# Patient Record
Sex: FEMALE | Race: BLACK OR AFRICAN AMERICAN
[De-identification: names, ages, dates, MRNs, and addresses within clinical notes are randomized per-mention and may not be internally consistent; named-entity substitution may affect disease eponyms.]

---

## 2018-04-13 ENCOUNTER — HOSPITAL ENCOUNTER (OUTPATIENT)
Dept: HOSPITAL 63 - SURG | Age: 58
Discharge: HOME | End: 2018-04-13
Attending: INTERNAL MEDICINE
Payer: MEDICARE

## 2018-04-13 VITALS — SYSTOLIC BLOOD PRESSURE: 112 MMHG | DIASTOLIC BLOOD PRESSURE: 63 MMHG

## 2018-04-13 DIAGNOSIS — E03.9: ICD-10-CM

## 2018-04-13 DIAGNOSIS — F32.9: ICD-10-CM

## 2018-04-13 DIAGNOSIS — Z12.11: Primary | ICD-10-CM

## 2018-04-13 DIAGNOSIS — I10: ICD-10-CM

## 2018-04-13 DIAGNOSIS — F79: ICD-10-CM

## 2018-04-13 DIAGNOSIS — D64.9: ICD-10-CM

## 2018-04-28 ENCOUNTER — HOSPITAL ENCOUNTER (EMERGENCY)
Dept: HOSPITAL 63 - ER | Age: 58
Discharge: HOME | End: 2018-04-28
Payer: MEDICARE

## 2018-04-28 VITALS — WEIGHT: 285 LBS | HEIGHT: 66 IN | BODY MASS INDEX: 45.8 KG/M2

## 2018-04-28 VITALS — DIASTOLIC BLOOD PRESSURE: 92 MMHG | SYSTOLIC BLOOD PRESSURE: 120 MMHG

## 2018-04-28 DIAGNOSIS — L50.9: Primary | ICD-10-CM

## 2018-04-28 DIAGNOSIS — E03.9: ICD-10-CM

## 2018-04-28 PROCEDURE — 99283 EMERGENCY DEPT VISIT LOW MDM: CPT

## 2018-04-28 NOTE — PHYS DOC
Past History


Past Medical History:  Hypothyroid, Hypotension





Adult General


[facility]


HPI





Patient is a 58 year old F who presents with hives that started today. She has 

no other symptoms at this time. She denies breathing difficulty She was seen by 

her primary care doctor and started on colchicine to treat gout. She was also 

started on Keflex that same time. This was approximately week ago. The 

colchicine course is completed however Keflex has several more days left She 

has no other new medications. She has no other associated symptoms at this 

time. She has no exacerbating or alleviating factors.





Review of Systems


Review of Systems





Constitutional: Denies fever or chills []


Eyes: Denies change in visual acuity, redness, or eye pain []


HENT: Denies nasal congestion or sore throat []


Respiratory: Denies cough or shortness of breath []


Cardiovascular: No additional information not addressed in HPI []


GI: Denies abdominal pain, nausea, vomiting, bloody stools or diarrhea []


: Denies dysuria or hematuria []


Musculoskeletal: Denies back pain or joint pain []


Integument: Negative except history of present illness


Neurologic: Denies headache, focal weakness or sensory changes []


Endocrine: Denies polyuria or polydipsia []





All other systems were reviewed and found to be within normal limits, except as 

documented in this note.





Family History


Family History


No pertinent family medical history was reported





Current Medications


Current Medications


Current medications were reviewed





Allergies


Allergies


Allergies were reviewed





Physical Exam


Physical Exam





Constitutional: Well developed, well nourished, no acute distress, non-toxic 

appearance. []


HENT: Normocephalic, atraumatic, bilateral external ears normal, oropharynx 

moist, no oral exudates, nose normal. []


Eyes: PERRLA, EOMI, conjunctiva normal, no discharge. [] 


Neck: Normal range of motion, no tenderness, supple, no stridor. [] 


Cardiovascular:Heart rate regular rhythm, no murmur []


Lungs & Thorax:  Bilateral breath sounds clear to auscultation []


Abdomen: Bowel sounds normal, soft, no tenderness, no masses, no pulsatile 

masses. [] 


Skin: Raised macular rash on the trunk predominantly.


Extremities: No tenderness, no cyanosis, no clubbing, ROM intact, no edema. [] 


Neurologic: Alert and oriented X 3, normal motor function, normal sensory 

function, no focal deficits noted. []


Psychologic: Affect normal, judgement normal, mood normal. []





Current Patient Data


Vital Signs





Vital Signs








  Date Time  Temp Pulse Resp B/P (MAP) Pulse Ox O2 Delivery O2 Flow Rate FiO2


 


4/28/18 10:58 98.1 100 24  98 Room Air  











EKG


EKG


[]





Radiology/Procedures


Radiology/Procedures


[]





Course & Med Decision Making


Course & Med Decision Making


Pertinent Labs and Imaging studies reviewed. (See chart for details)





[]





Dragon Disclaimer


Dragon Disclaimer


This electronic medical record was generated, in whole or in part, using a 

voice recognition dictation system.





Departure


Departure:


Impression:  


 Primary Impression:  


 Hives


Disposition:  01 HOME, SELF-CARE


Condition:  STABLE


Referrals:  


MIYA CASTRO (PCP)


Patient Instructions:  Hives





Additional Instructions:  


Desire was seen in the emergency department for skin rash. No emergency 

medical condition was found on history or physical exam. Her symptoms are most 

consistent with hives. She is encouraged to discontinue her antibiotic. She is 

given a prescription for topical steroids to be used as needed for symptom 

management. She was also found to have swelling in her legs and was advised to 

use TRACY hose regularly. She is encouraged to return to the emergency room if 

she develops new or worsening symptoms. She is also advised follow-up with her 

primary care doctor as needed for further management.


Scripts


Triamcinolone Acetonide (TRIAMCINOLONE ACETONIDE) 15 Gm Cream..g.


1 YURY TP BID, #15 GM 1 Refill


   Prov: MARILUZ ABDALLA MD         4/28/18











MARILUZ ABDALLA MD Apr 28, 2018 11:00

## 2018-12-01 ENCOUNTER — HOSPITAL ENCOUNTER (INPATIENT)
Dept: HOSPITAL 63 - ER | Age: 58
LOS: 3 days | Discharge: HOME | DRG: 175 | End: 2018-12-04
Attending: INTERNAL MEDICINE | Admitting: INTERNAL MEDICINE
Payer: MEDICARE

## 2018-12-01 VITALS — BODY MASS INDEX: 52.87 KG/M2 | WEIGHT: 287.31 LBS | HEIGHT: 62 IN

## 2018-12-01 DIAGNOSIS — E03.9: ICD-10-CM

## 2018-12-01 DIAGNOSIS — E43: ICD-10-CM

## 2018-12-01 DIAGNOSIS — I26.99: Primary | ICD-10-CM

## 2018-12-01 DIAGNOSIS — Q90.9: ICD-10-CM

## 2018-12-01 DIAGNOSIS — Z79.899: ICD-10-CM

## 2018-12-01 DIAGNOSIS — F03.90: ICD-10-CM

## 2018-12-01 DIAGNOSIS — Z90.710: ICD-10-CM

## 2018-12-01 DIAGNOSIS — Z79.01: ICD-10-CM

## 2018-12-01 DIAGNOSIS — I10: ICD-10-CM

## 2018-12-01 LAB
ALBUMIN SERPL-MCNC: 2.5 G/DL (ref 3.4–5)
ALBUMIN/GLOB SERPL: 0.4 {RATIO} (ref 1–1.7)
ALP SERPL-CCNC: 105 U/L (ref 46–116)
ALT SERPL-CCNC: 21 U/L (ref 14–59)
ANION GAP SERPL CALC-SCNC: 4 MMOL/L (ref 6–14)
APTT PPP: YELLOW S
AST SERPL-CCNC: 20 U/L (ref 15–37)
BACTERIA #/AREA URNS HPF: 0 /HPF
BASOPHILS # BLD AUTO: 0.2 X10^3/UL (ref 0–0.2)
BASOPHILS NFR BLD: 4 % (ref 0–3)
BILIRUB SERPL-MCNC: 0.1 MG/DL (ref 0.2–1)
BILIRUB UR QL STRIP: (no result)
BUN/CREAT SERPL: 18 (ref 6–20)
CA-I SERPL ISE-MCNC: 25 MG/DL (ref 7–20)
CALCIUM SERPL-MCNC: 8.8 MG/DL (ref 8.5–10.1)
CHLORIDE SERPL-SCNC: 98 MMOL/L (ref 98–107)
CO2 SERPL-SCNC: 37 MMOL/L (ref 21–32)
CREAT SERPL-MCNC: 1.4 MG/DL (ref 0.6–1)
EOSINOPHIL NFR BLD: 0.2 X10^3/UL (ref 0–0.7)
EOSINOPHIL NFR BLD: 3 % (ref 0–3)
ERYTHROCYTE [DISTWIDTH] IN BLOOD BY AUTOMATED COUNT: 16.5 % (ref 11.5–14.5)
FIBRINOGEN PPP-MCNC: CLEAR MG/DL
GFR SERPLBLD BASED ON 1.73 SQ M-ARVRAT: 46.9 ML/MIN
GLOBULIN SER-MCNC: 5.8 G/DL (ref 2.2–3.8)
GLUCOSE SERPL-MCNC: 103 MG/DL (ref 70–99)
GLUCOSE UR STRIP-MCNC: (no result) MG/DL
HCT VFR BLD CALC: 44.4 % (ref 36–47)
HGB BLD-MCNC: 14.2 G/DL (ref 12–15.5)
LIPASE: 94 U/L (ref 73–393)
LYMPHOCYTES # BLD: 1.2 X10^3/UL (ref 1–4.8)
LYMPHOCYTES NFR BLD AUTO: 26 % (ref 24–48)
MAGNESIUM SERPL-MCNC: 2.2 MG/DL (ref 1.8–2.4)
MCH RBC QN AUTO: 28 PG (ref 25–35)
MCHC RBC AUTO-ENTMCNC: 32 G/DL (ref 31–37)
MCV RBC AUTO: 89 FL (ref 79–100)
MONO #: 0.7 X10^3/UL (ref 0–1.1)
MONOCYTES NFR BLD: 15 % (ref 0–9)
NEUT #: 2.3 X10^3UL (ref 1.8–7.7)
NEUTROPHILS NFR BLD AUTO: 52 % (ref 31–73)
NITRITE UR QL STRIP: (no result)
PLATELET # BLD AUTO: 329 X10^3/UL (ref 140–400)
POTASSIUM SERPL-SCNC: 3.4 MMOL/L (ref 3.5–5.1)
PROT SERPL-MCNC: 8.3 G/DL (ref 6.4–8.2)
RBC # BLD AUTO: 5.01 X10^6/UL (ref 3.5–5.4)
RBC #/AREA URNS HPF: (no result) /HPF (ref 0–2)
SODIUM SERPL-SCNC: 139 MMOL/L (ref 136–145)
SP GR UR STRIP: 1.01
SQUAMOUS #/AREA URNS LPF: (no result) /LPF
UROBILINOGEN UR-MCNC: 0.2 MG/DL
WBC # BLD AUTO: 4.5 X10^3/UL (ref 4–11)
WBC #/AREA URNS HPF: (no result) /HPF (ref 0–4)

## 2018-12-01 PROCEDURE — 90471 IMMUNIZATION ADMIN: CPT

## 2018-12-01 PROCEDURE — 96375 TX/PRO/DX INJ NEW DRUG ADDON: CPT

## 2018-12-01 PROCEDURE — 96361 HYDRATE IV INFUSION ADD-ON: CPT

## 2018-12-01 PROCEDURE — 82553 CREATINE MB FRACTION: CPT

## 2018-12-01 PROCEDURE — 84484 ASSAY OF TROPONIN QUANT: CPT

## 2018-12-01 PROCEDURE — 90756 CCIIV4 VACC ABX FREE IM: CPT

## 2018-12-01 PROCEDURE — 85610 PROTHROMBIN TIME: CPT

## 2018-12-01 PROCEDURE — 80053 COMPREHEN METABOLIC PANEL: CPT

## 2018-12-01 PROCEDURE — 85730 THROMBOPLASTIN TIME PARTIAL: CPT

## 2018-12-01 PROCEDURE — 71260 CT THORAX DX C+: CPT

## 2018-12-01 PROCEDURE — 36415 COLL VENOUS BLD VENIPUNCTURE: CPT

## 2018-12-01 PROCEDURE — 80048 BASIC METABOLIC PNL TOTAL CA: CPT

## 2018-12-01 PROCEDURE — 81001 URINALYSIS AUTO W/SCOPE: CPT

## 2018-12-01 PROCEDURE — P9612 CATHETERIZE FOR URINE SPEC: HCPCS

## 2018-12-01 PROCEDURE — 96365 THER/PROPH/DIAG IV INF INIT: CPT

## 2018-12-01 PROCEDURE — 85025 COMPLETE CBC W/AUTO DIFF WBC: CPT

## 2018-12-01 PROCEDURE — 83690 ASSAY OF LIPASE: CPT

## 2018-12-01 PROCEDURE — 74177 CT ABD & PELVIS W/CONTRAST: CPT

## 2018-12-01 PROCEDURE — 83735 ASSAY OF MAGNESIUM: CPT

## 2018-12-01 PROCEDURE — 87641 MR-STAPH DNA AMP PROBE: CPT

## 2018-12-01 PROCEDURE — 96376 TX/PRO/DX INJ SAME DRUG ADON: CPT

## 2018-12-01 PROCEDURE — 93005 ELECTROCARDIOGRAM TRACING: CPT

## 2018-12-01 NOTE — RAD
CT CHEST ABD PELVIS W/CONTRAST dated 12/1/2018 9:55 PM

 

Indication:ABDOMINAL PAIN WITH NAUSEA, COUGH, 75MLS OMNI 300 IV CONTRAST. 

PT HAS DOWN'S SYNDROME. UNABLE TO FOLLOW BREATHING INSTRUCTIONS. 

 

Comparison: No comparison is available.

 

Technique: CT images were performed using an infusion of 75 mL Omnipaque 

300.

One or more of the following individualized dose reduction techniques were

utilized for this examination:  1. Automated exposure control  2. 

Adjustment of the mA and/or kV according to patient size  3. Use of 

iterative reconstruction technique

 

Findings: 

There is some motion artifact. The patient could not follow breath-holding

instructions. The lungs appear clear. The central airways are patent. No 

enlarged lymph nodes are seen. Incidental note is made of an aberrant 

right subclavian artery. Although this study was not performed to evaluate

the pulmonary arterial tree, large bilateral pulmonary emboli are seen at 

the distal main pulmonary arteries extending into lobar and segmental 

branches. There is suggestion of some flattening of the interventricular 

septum, raising the possibility of increased right heart pressure.

 

The liver and spleen are homogeneous in density and normal in 

configuration. The gallbladder was decompressed at the time of scanning. 

Both kidneys enhance with contrast. No mass or obstruction is seen. The 

adrenal glands are not enlarged. The pancreas appears normal. No 

adenopathy or soft tissue mass is seen in the abdomen. There is no 

apparent inflammatory process. Moderate stool is present through the 

colon. A normal appendix appears to be seen extending anteriorly from the 

cecum.

 

Images through the pelvis show no abnormality of the distal ureters or 

bladder. No pelvic or inguinal adenopathy is seen.   

 

IMPRESSION:

Large bilateral pulmonary emboli. There may be increased right heart 

pressure.

 

No acute findings in the abdomen or pelvis.

 

Critical results were telephoned to the emergency room physician.

 

Electronically signed by: Daniel Copeland Jr., MD (12/1/2018 10:26 PM) 

[facility]

## 2018-12-01 NOTE — EKG
[facility] [address], [place] [address]

Test Date:    2018               Test Time:    21:39:54

Pat Name:     MODESTO GARCIA             Department:   

Patient ID:   [id number]           Room:          

Gender:       F                        Technician:   

:          [id number]               Requested By: NATHALIE BLANDON

Order Number: [id number]            Reading MD:   Dayday Mckeon

                                 Measurements

Intervals                              Axis          

Rate:         89                       P:            

KS:                                    QRS:          -66

QRSD:         86                       T:            34

QT:           368                                    

QTc:          454                                    

                           Interpretive Statements

SINUS RHYTHM

ABNORMAL LEFT AXIS DEVIATION

LEFT ANTERIOR FASCICULAR BLOCK

QRS(T) CONTOUR ABNORMALITY

CONSIDER ANTEROSEPTAL MYOCARDIAL DAMAGE



Electronically Signed On 12-3-2018 8:59:35 CST by Dayday Mckeon

## 2018-12-01 NOTE — PHYS DOC
Past History


Past Medical History:  Anemia, Hypertension, Hypothyroid


Additional Past Medical Histor:  Down's Syndrome


Past Medical History


Unable to obtain due to hx of Down's Syndrome


Past Surgical History:  Hysterectomy


Past Surgical History


Unable to obtain due to hx of Down's Syndrome


Social History


Unable to obtain due to hx of Down's Syndrome





Adult General


Chief Complaint


Chief Complaint:  Dyspnea





HPI


HPI


This is a 58-year-old female with a PMH of Down syndrome & dementia who was 

brought to the ED for an episode of labored breathing, noticed by her caregiver 

this evening, which has since resolved. The patient appeared to be having 

difficulty exhaling while lying in bed at home and has had a nonproductive 

cough today according to her caregiver.  The patient also complained of having 

abdominal pain today, but is unable to characterize or localize it.  Denies 

recent fevers, vomiting, diarrhea.  Family reports some increased BLE edema 

which has since improved.





Unable to obtain complete HPI due to patient's baseline mental status.





Review of Systems


Review of Systems





Constitutional: Denies fever


HENT: Reports rhinorrhea


Respiratory: Reports nonproductive cough and labored breathing


GI: Reports abdominal pain; Denies vomiting, diarrhea





Unable to obtain complete ROS due to pt's baseline mental status





Allergies


Allergies





Allergies








Coded Allergies Type Severity Reaction Last Updated Verified


 


  No Known Drug Allergies    12/1/18 No











Physical Exam


Physical Exam





Constitutional: Well developed, well nourished, no acute distress, non-toxic 

appearance. []


HENT: Normocephalic, atraumatic, dry mucous membranes. []


Eyes: Conjunctiva normal, no discharge, Amblyopia of left eye


Neck: Normal range of motion, no tenderness, supple, no meningeal signs [] 


Cardiovascular: Heart rate regular rhythm, no murmur []


Lungs & Thorax:  Bilateral breath sounds clear to auscultation []


Abdomen: Soft, no tenderness, mild distention noted


Skin: Warm, dry, no erythema, no rash. [] 


Back: No tenderness, no CVA tenderness. [] 


Extremities: Bilateral LE pitting edema R > L with increased fullness in R calf

, possible palpable cord to right calf


Neurologic: Alert, normal orientation for pt's baseline per caregiver,  no 

focal deficits noted. []


Psychologic: Affect normal, mood normal. []





EKG


EKG


12/01/18 @ 2139 - Sinus arrhythmia with occasional premature junctional beats 

and LAD. No ST abnormalities.





Radiology/Procedures


Radiology/Procedures


PROCEDURE: CT CHEST ABD PELVIS W/CONTRAST





CT CHEST ABD PELVIS W/CONTRAST dated 12/1/2018 9:55 PM


 


Indication:ABDOMINAL PAIN WITH NAUSEA, COUGH, 75MLS OMNI 300 IV CONTRAST. 


PT HAS DOWN'S SYNDROME. UNABLE TO FOLLOW BREATHING INSTRUCTIONS. 


 


Comparison: No comparison is available.


 


Technique: CT images were performed using an infusion of 75 mL Omnipaque 


300.


One or more of the following individualized dose reduction techniques were


utilized for this examination:  1. Automated exposure control  2. 


Adjustment of the mA and/or kV according to patient size  3. Use of 


iterative reconstruction technique


 


Findings: 


There is some motion artifact. The patient could not follow breath-holding


instructions. The lungs appear clear. The central airways are patent. No 


enlarged lymph nodes are seen. Incidental note is made of an aberrant 


right subclavian artery. Although this study was not performed to evaluate


the pulmonary arterial tree, large bilateral pulmonary emboli are seen at 


the distal main pulmonary arteries extending into lobar and segmental 


branches. There is suggestion of some flattening of the interventricular 


septum, raising the possibility of increased right heart pressure.


 


The liver and spleen are homogeneous in density and normal in 


configuration. The gallbladder was decompressed at the time of scanning. 


Both kidneys enhance with contrast. No mass or obstruction is seen. The 


adrenal glands are not enlarged. The pancreas appears normal. No 


adenopathy or soft tissue mass is seen in the abdomen. There is no 


apparent inflammatory process. Moderate stool is present through the 


colon. A normal appendix appears to be seen extending anteriorly from the 


cecum.


 


Images through the pelvis show no abnormality of the distal ureters or 


bladder. No pelvic or inguinal adenopathy is seen.   


 


IMPRESSION:


Large bilateral pulmonary emboli. There may be increased right heart 


pressure.


 


No acute findings in the abdomen or pelvis.


 


Critical results were telephoned to the emergency room physician.


 


Electronically signed by: Daniel Copeland Jr., MD (12/1/2018 10:26 PM) 


[facility]





Course & Med Decision Making


Course & Med Decision Making


Pertinent Labs and Imaging studies reviewed. (See chart for details)





Patient with past history of Down syndrome presents with report of episode of 

labored breathing and possible abdominal pain. Sats stable. EKG stable. Labs 

obtained and posted to chart. BUN/creatinine slightly elevated. IV fluid 

hydration provided. CT chest/abdomen/pelvis obtained with findings consistent 

for large lateral pulmonary emboli. Heparin bolus/drip initiated.





Patient requiring admission for further evaluation and treatment. Discussed 

with Dr. Diaz (hospitalist) who is in agreement with admission. Discussed 

findings and plan with family, who acknowledge understanding and agreement.





Dragon Disclaimer


Dragon Disclaimer


This electronic medical record was generated, in whole or in part, using a 

voice recognition dictation system.





Departure


Departure:


Impression:  


 Primary Impression:  


 Bilateral pulmonary embolism


Disposition:  09 ADMITTED AS INPATIENT


Admitting Physician:  Leatha Diaz


Condition:  GUARDED


Referrals:  


PARVEEN AGUILERA (PCP)





Critical Care Time


Critical care time was 30 minutes which includes time at bedside, spent in 

discussion of patient's care with specialists and/or family members, with 

interpretation of laboratory and/or radiological studies and is exclusive of 

procedures.











NATHALIE BLANDON DO Dec 1, 2018 21:35

## 2018-12-02 VITALS — SYSTOLIC BLOOD PRESSURE: 102 MMHG | DIASTOLIC BLOOD PRESSURE: 71 MMHG

## 2018-12-02 VITALS — DIASTOLIC BLOOD PRESSURE: 74 MMHG | SYSTOLIC BLOOD PRESSURE: 113 MMHG

## 2018-12-02 VITALS — DIASTOLIC BLOOD PRESSURE: 67 MMHG | SYSTOLIC BLOOD PRESSURE: 96 MMHG

## 2018-12-02 VITALS — DIASTOLIC BLOOD PRESSURE: 52 MMHG | SYSTOLIC BLOOD PRESSURE: 108 MMHG

## 2018-12-02 VITALS — DIASTOLIC BLOOD PRESSURE: 72 MMHG | SYSTOLIC BLOOD PRESSURE: 113 MMHG

## 2018-12-02 VITALS — SYSTOLIC BLOOD PRESSURE: 136 MMHG | DIASTOLIC BLOOD PRESSURE: 79 MMHG

## 2018-12-02 LAB
ANION GAP SERPL CALC-SCNC: 5 MMOL/L (ref 6–14)
CA-I SERPL ISE-MCNC: 20 MG/DL (ref 7–20)
CALCIUM SERPL-MCNC: 8.3 MG/DL (ref 8.5–10.1)
CHLORIDE SERPL-SCNC: 98 MMOL/L (ref 98–107)
CO2 SERPL-SCNC: 34 MMOL/L (ref 21–32)
CREAT SERPL-MCNC: 1.4 MG/DL (ref 0.6–1)
GFR SERPLBLD BASED ON 1.73 SQ M-ARVRAT: 46.9 ML/MIN
GLUCOSE SERPL-MCNC: 96 MG/DL (ref 70–99)
POTASSIUM SERPL-SCNC: 3.5 MMOL/L (ref 3.5–5.1)
SODIUM SERPL-SCNC: 137 MMOL/L (ref 136–145)

## 2018-12-02 RX ADMIN — Medication SCH MG: at 08:43

## 2018-12-02 RX ADMIN — TRIAMTERENE AND HYDROCHLOROTHIAZIDE SCH TAB: 37.5; 25 TABLET ORAL at 08:43

## 2018-12-02 RX ADMIN — MIRABEGRON SCH MG: 25 TABLET, FILM COATED, EXTENDED RELEASE ORAL at 08:43

## 2018-12-02 RX ADMIN — LEVOTHYROXINE SODIUM SCH MCG: 125 TABLET ORAL at 06:14

## 2018-12-02 RX ADMIN — VITAMIN D, TAB 1000IU (100/BT) SCH UNIT: 25 TAB at 08:43

## 2018-12-02 RX ADMIN — APIXABAN SCH MG: 5 TABLET, FILM COATED ORAL at 14:56

## 2018-12-02 RX ADMIN — CETIRIZINE HYDROCHLORIDE SCH MG: 10 TABLET, FILM COATED ORAL at 08:43

## 2018-12-02 NOTE — HP
ADMIT DATE:  12/01/2018



HISTORY OF PRESENT ILLNESS:  The patient is a 58-year-old -American

female patient who was brought to the Emergency Room with an episode of labored

breathing noticed by her caregiver yesterday evening that has since resolved. 

The patient appeared to be having difficulty exhaling while lying in bed at home

and she has had a nonproductive cough according to her caregiver.  The patient

also complained of having abdominal pain, but unable to characterize it or

localize it.  She denied any fever, vomiting, diarrhea.  Family report some

increased bilateral lower extremity edema, which has since improved.  The

patient is known to have Down syndrome and mostly nonverbal.  She was

extensively investigated in the Emergency Room, has had a CT scan of the chest

and abdomen and pelvis.  Her CT scan of the chest showed she has large bilateral

pulmonary emboli and there may be increased right heart pressure.  However,

there is no acute finding in the abdomen and pelvis.  The patient was admitted,

started on heparin drip.



PAST MEDICAL HISTORY:  Significant for hypertension, hypothyroidism, Down

syndrome.



PAST SURGICAL HISTORY:  Significant for hysterectomy.



FAMILY HISTORY:  Unobtainable.



SOCIAL HISTORY:  She is apparently a resident at [facility].  She does not

smoke, drink alcohol, or use any recreational drugs.



ALLERGIES:  She has no known drug allergies.



MEDICATIONS:  She is currently on following medications:  She is on cetirizine

10 mg once a day, ferrous sulfate 325 mg once a day,

triamterene/hydrochlorothiazide 37.5/25, she takes 2 tablets daily, and

levothyroxine 125 mcg once a day, Myrbetriq 25 mg once a day, cholecalciferol

for vitamin D3 _____ international units once a day.



REVIEW OF SYSTEMS:  Unobtainable.



PHYSICAL EXAMINATION:

GENERAL:  On arrival to the Emergency Room, the patient was slightly tachypneic.

 There is no pallor, jaundice, cyanosis, or thyromegaly.  No jugular venous

distention.  Mild bilateral lower limb edema.

VITAL SIGNS:  Her heart rate was 96, blood pressure 111/69.  Her temperature was

98, respiratory rate was 24, and oxygen saturation was 97% on room air.

HEAD, EYES, EARS, NOSE, AND THROAT:  Showed normocephalic, atraumatic. 

NECK:  Supple.

HEART:  Showed normal first and second heart sounds.  No gallop, rub, or murmur.

CHEST:  Clear to auscultation.  No crepitation or rhonchi.

ABDOMEN:  Distended, soft, nontender.  No guarding or rigidity.  No

organomegaly.  All hernial orifice intact.  Bowel sounds normal.

EXTREMITIES:  Showed no clubbing or cyanosis, but she has bilateral lower

extremity pitting edema, more on the right than left with increased fullness of

the right calf.

NEUROLOGIC:  Showed she was alert and basically at her baseline as per her

caregiver with no obvious focal deficit.  



DIAGNOSTIC STUDIES:  Her EKG showed that she was in sinus rhythm with occasional

premature junctional beats and left axis deviation, no ST segment abnormalities.

 Given that she has large bilateral pulmonary emboli.  She was admitted and was

started on heparin.  Her other lab work showed that her white cell count was

4500, hemoglobin 14, hematocrit 44, MCV 89, platelet count 329,000 with normal

manual differential.  Her chemistry showed a serum sodium 139, potassium 3.4,

chloride 98, bicarbonate 37, anion gap of 4, BUN 25, creatinine 1.4, estimated

GFR was 47 mL per minute.  Her glucose was 103.  Calcium was 8.8, magnesium 2.2.

 Total bilirubin, AST, ALT, alkaline phosphatase were normal.  Her first set of

cardiac enzymes showed troponin to be less than 0.17.  Her total protein was

8.3, albumin was 2.5 and lipase was 94.  Her prothrombin time was 10.5, INR 1.1,

aPTT was 24.



ASSESSMENT AND PLAN:  In summary, this is a 58-year-old -American female

patient with Down syndrome, who came with shortness of breath and was diagnosed

with bilateral pulmonary emboli, will continue with heparin drip.  Repeat her

lab work tomorrow. switch her to one of the newer oral anticoagulant and

discontinue heparin.





______________________________

ANABELL SORENSON MD



DR:  LAURENCE/rhonda  JOB#:  [id number] / [id number]

DD:  12/02/2018 12:53  DT:  12/02/2018 13:25

## 2018-12-02 NOTE — PN
DATE:  12/02/2018



SUBJECTIVE:  The patient is sitting up in her bed, eating her lunch comfortably,

in no apparent distress.  She is mostly nonverbal; however, clinically she does

not seem to be tachypneic.  Her oxygen saturation was 97% on room air.



OBJECTIVE:

GENERAL:  When I examined her, she looked well and was clearly in no apparent

respiratory distress and no pallor, jaundice, cyanosis, or thyromegaly.  No

jugular venous distention.  No lower limb edema.

VITAL SIGNS:  Her heart rate was 85, blood pressure was 102/71, temperature was

97.7, respiratory rate 22, and oxygen saturation was 99% on room air.

HEAD, EYES, EARS, NOSE, AND THROAT:  Showed normocephalic, atraumatic.

NECK:  Supple.

HEART:  Showed normal first and second sounds.  No gallop, rub, or murmur.

CHEST:  Clear to auscultation.  No crepitation or rhonchi.

ABDOMEN:  Distended, soft, nontender.  No guarding or rigidity.  No

organomegaly.  Hernial orifice intact.  Bowel sounds normal.

NEUROLOGIC:  She is mostly nonverbal.  She has Down syndrome.



Her intake and output are incompletely recorded.



LABORATORY DATA:  Her lab work this morning showed a serum sodium 137, potassium

3.5, chloride 96, bicarbonate 34, anion gap of 5, BUN 20, creatinine 1.4,

estimated GFR was 47 mL per minute.  Her glucose was 96, calcium was 8.3.  She

has 3 sets of cardiac enzymes that were negative.  Total protein was 8.3,

albumin was 2.5.



ASSESSMENT AND PLAN:  Shortness of breath and bilateral pulmonary emboli for

which we will discontinue heparin drip and start her on Eliquis 10 mg twice a

day for a week and then 5 mg twice a day for at least 6 months.  Other medical

problems include hypertension, hypothyroidism, Down syndrome.





______________________________

ANABELL SORENSON MD



DR:  LAURENCE/rhonda  JOB#:  [id number] / [id number]

DD:  12/02/2018 14:27  DT:  12/02/2018 17:00

## 2018-12-03 VITALS — DIASTOLIC BLOOD PRESSURE: 60 MMHG | SYSTOLIC BLOOD PRESSURE: 95 MMHG

## 2018-12-03 VITALS — DIASTOLIC BLOOD PRESSURE: 75 MMHG | SYSTOLIC BLOOD PRESSURE: 119 MMHG

## 2018-12-03 VITALS — SYSTOLIC BLOOD PRESSURE: 100 MMHG | DIASTOLIC BLOOD PRESSURE: 68 MMHG

## 2018-12-03 VITALS — SYSTOLIC BLOOD PRESSURE: 113 MMHG | DIASTOLIC BLOOD PRESSURE: 74 MMHG

## 2018-12-03 VITALS — DIASTOLIC BLOOD PRESSURE: 57 MMHG | SYSTOLIC BLOOD PRESSURE: 101 MMHG

## 2018-12-03 RX ADMIN — APIXABAN SCH MG: 5 TABLET, FILM COATED ORAL at 06:49

## 2018-12-03 RX ADMIN — CETIRIZINE HYDROCHLORIDE SCH MG: 10 TABLET, FILM COATED ORAL at 08:38

## 2018-12-03 RX ADMIN — Medication SCH MG: at 08:38

## 2018-12-03 RX ADMIN — VITAMIN D, TAB 1000IU (100/BT) SCH UNIT: 25 TAB at 08:38

## 2018-12-03 RX ADMIN — LEVOTHYROXINE SODIUM SCH MCG: 125 TABLET ORAL at 06:49

## 2018-12-03 RX ADMIN — APIXABAN SCH MG: 5 TABLET, FILM COATED ORAL at 16:54

## 2018-12-03 RX ADMIN — TRIAMTERENE AND HYDROCHLOROTHIAZIDE SCH TAB: 37.5; 25 TABLET ORAL at 08:38

## 2018-12-03 RX ADMIN — MIRABEGRON SCH MG: 25 TABLET, FILM COATED, EXTENDED RELEASE ORAL at 08:38

## 2018-12-03 NOTE — DS
DATE OF DISCHARGE:  12/03/2018



HOSPITAL COURSE:  The patient is a 58-year-old -American female patient

with Down syndrome, who was brought to the Emergency Room by her caregiver from

the [facility] with a complaint of difficulty breathing, especially when lying

in bed at home, has also nonproductive cough.  The patient have also complained

of having abdominal pain, but unable to characterize it or localize it.  She is

known to have Down syndrome.  She is mostly nonverbal.  She was extensively

investigated in the Emergency Room, had a CT scan of the chest, abdomen and

pelvis.  Her CT scan of the chest showed that she has large bilateral pulmonary

emboli.  There might be also some right heart pressure; however, there is no

acute finding in the abdomen and pelvis.  The patient was started on heparin

drip initially and yesterday, we switched her to Eliquis 10 mg twice a day for 7

days and then 5 mg twice a day for 6 months.  She remained hemodynamically

stable, afebrile with maintaining her oxygen saturation at 94% on room air and a

decision was made to discharge her back to [facility] to continue on Eliquis for

at least 6 months.



PHYSICAL EXAMINATION:

GENERAL:  When I examined her this morning, she looked well and was clearly in

no apparent respiratory distress, pale, but no jaundice, cyanosis or

thyromegaly.  No jugular venous distension.  Mild bilateral lower limb edema.

VITAL SIGNS:  Her heart rate was 77, blood pressure 100/68, temperature was

97.7, respiratory rate 20 and oxygen saturation was 94% on room air.

HEAD, EYES, EARS, NOSE AND THROAT:  Showed normocephalic, atraumatic.

NECK:  Supple.

HEART:  Showed normal first and second heart sounds with no gallop, rub or

murmur.

CHEST:  Clear to auscultation.  No crepitation or rhonchi.

ABDOMEN:  Distended, soft, nontender.  No guarding or rigidity.  No

organomegaly.  Hernial orifice intact.  Bowel sounds normal.

NEUROLOGIC:  She has Down syndrome.  She is mostly nonverbal, although all her

cranial nerves are intact.  She moves extremities without difficulty.



LABORATORY DATA:  On admission showed a white cell count of 4500, hemoglobin 14,

hematocrit 44, MCV 89 and platelet count of 329,000.  Her chemistry showed serum

sodium of 137, potassium 3.5, chloride 98, bicarbonate 34, anion gap of 5, BUN

20, creatinine 1.4, estimated GFR was 47 mL per minute.  Her glucose was 96 and

calcium was 8.3.  Prothrombin time was 10.5, INR 1.1.  Urinalysis was

essentially unremarkable and nasal screen for MRSA by PCR was negative.



ASSESSMENT AND PLAN:  The patient was discharged back to group home to continue

on her cetirizine 10 mg once a day, cholecalciferol for vitamin D3 1000

international units once a day, ferrous sulfate 325 mg once a day, levothyroxine

sodium 125 mcg once a day, Myrbetriq 25 mg once a day,

triamterene/hydrochlorothiazide 37.5/25 two tablets once a day.  She was also

discharged to continue on apixaban 10 mg twice a day for 6 more days and then

apixaban 5 mg twice a day for 6 months.



FINAL DISCHARGE DIAGNOSES:

1.  Bilateral pulmonary emboli.

2.  Hypertension.

3.  Hypothyroidism.

4.  Down syndrome.





______________________________

ANABELL SORENSON MD



DR:  LAURENCE/rhonda  JOB#:  [id number] / [id number]

DD:  12/03/2018 11:12  DT:  12/03/2018 19:46

## 2018-12-04 VITALS
SYSTOLIC BLOOD PRESSURE: 115 MMHG | DIASTOLIC BLOOD PRESSURE: 71 MMHG | DIASTOLIC BLOOD PRESSURE: 71 MMHG | SYSTOLIC BLOOD PRESSURE: 115 MMHG

## 2018-12-04 VITALS — DIASTOLIC BLOOD PRESSURE: 62 MMHG | SYSTOLIC BLOOD PRESSURE: 103 MMHG

## 2018-12-04 RX ADMIN — TRIAMTERENE AND HYDROCHLOROTHIAZIDE SCH TAB: 37.5; 25 TABLET ORAL at 08:02

## 2018-12-04 RX ADMIN — LEVOTHYROXINE SODIUM SCH MCG: 125 TABLET ORAL at 05:30

## 2018-12-04 RX ADMIN — MIRABEGRON SCH MG: 25 TABLET, FILM COATED, EXTENDED RELEASE ORAL at 08:02

## 2018-12-04 RX ADMIN — VITAMIN D, TAB 1000IU (100/BT) SCH UNIT: 25 TAB at 08:02

## 2018-12-04 RX ADMIN — APIXABAN SCH MG: 5 TABLET, FILM COATED ORAL at 05:31

## 2018-12-04 RX ADMIN — Medication SCH MG: at 08:02

## 2018-12-04 RX ADMIN — CETIRIZINE HYDROCHLORIDE SCH MG: 10 TABLET, FILM COATED ORAL at 08:02

## 2018-12-04 NOTE — DS
DATE OF DISCHARGE:  12/04/2018



HOSPITAL COURSE:  The patient is a 58-year-old -American female patient,

a resident at [facility], who was brought to the Emergency Room by her caregiver

from the [facility] with a complaint of difficulty breathing, especially when

lying in bed at home, has also had nonproductive cough.  The patient has also

complained of having abdominal pain, but unable to characterize it or localize

it.  She is known to have Down syndrome.  She is mostly nonverbal.  She was

extensively investigated in the Emergency Room and had a CT scan of the chest,

abdomen and pelvis.  CT scan of the chest showed that she has large bilateral

pulmonary emboli and there might be some right heart pressure.  However, there

is no acute finding in the abdomen and pelvis.  The patient was started on

heparin drip initially and we switched her to Eliquis 10 mg twice a day for 7

days and then 5 mg twice a day for 6 months.  She remained hemodynamically

stable, afebrile.  She is maintaining her oxygen saturations 94-96% on room air

and a decision was made to discharge her back to [facility] to continue with

Eliquis as directed.



PHYSICAL EXAMINATION:

GENERAL:  On examining her today, she looked well and was clearly in no apparent

respiratory distress.  No pallor, jaundice, cyanosis, or thyromegaly.  No

jugular venous distension.  No limb edema.

VITAL SIGNS:  Her heart rate was 77, blood pressure was 115/71, temperature was

98.3, respiratory rate was 20, and oxygen saturation was 93% on room air.

HEAD, EYES, EARS, NOSE AND THROAT:  Normocephalic, atraumatic.

NECK:  Supple.

HEART:  Showed normal first and second heart sounds.  No gallop, rub or murmur.

CHEST:  Clear to auscultation.  No crepitation or rhonchi.

ABDOMEN:  Distended, soft, nontender.

NEUROLOGIC:  She is mostly nonverbal; however, all her cranial nerves are

intact.  She moves extremities without difficulty, although she is mostly

bedbound, chair bound.



Her intake over the last 24 hours was 1750.  No output was recorded.



LABORATORY WORK:  Showed a white cell count 4500, hemoglobin 14, hematocrit 44,

MCV 89 and platelet count 229,000.  Serum sodium was 137, potassium 3.5,

chloride 98, bicarbonate 34, anion gap of 5, BUN 20, creatinine 1.4, estimated

GFR was 47 mL per minute.  Her glucose was 96, calcium was 8.3.



DISCHARGE MEDICATIONS:  She will be discharged back to group home to continue on

apixaban 10 mg twice a day for 5 more days and apixaban 5 mg twice a day for 6

months.  Should continue also on cetirizine 10 mg once a day, cholecalciferol

(vitamin D3) 1000 International unit once a day, ferrous sulfate 325 mg once a

day, levothyroxine sodium 125 mcg once a day, Myrbetriq 25 mg once a day,

triamterene/hydrochlorothiazide 1 tablet once a day.



FINAL DISCHARGE DIAGNOSES:

1.  Bilateral pulmonary emboli for which she is now on apixaban.

2.  Hypertension, well controlled.

3.  Hypothyroidism, clinically and biochemically euthyroid.

4.  Down syndrome.





______________________________

ANABELL SORENSON MD



DR:  LAURENCE/rhonda  JOB#:  [id number] / [id number]

DD:  12/04/2018 12:13  DT:  12/04/2018 12:55

## 2021-06-29 ENCOUNTER — HOSPITAL ENCOUNTER (OUTPATIENT)
Dept: HOSPITAL 63 - MAMMO | Age: 61
End: 2021-06-29
Attending: INTERNAL MEDICINE
Payer: MEDICARE

## 2021-06-29 DIAGNOSIS — Z12.31: Primary | ICD-10-CM

## 2021-06-29 PROCEDURE — 77067 SCR MAMMO BI INCL CAD: CPT

## 2021-07-06 NOTE — RAD
MG 2D BILAT SCREENING 6/29/2021 11:10 AM



INDICATION: Asymptomatic screening mammogram.



COMPARISON: 3/21/2018, 3/9/2017



TECHNIQUE: 2D CC and MLO projections were obtained of each breast.



FINDINGS:



Breast density: Category B: There are scattered areas of fibroglandular density.



Right breast: There are no suspicious microcalcifications, masses or areas of architectural distortio
n.



Left breast: There are no suspicious microcalcifications, masses or areas of architectural distortion
.



Bilateral mammogram is compared to prior examinations appears unchanged.



IMPRESSION:



Negative bilateral mammogram.



BI-RADS category: 1; Negative



Recommendations: Recommend annual screening mammography in one year. 



Electronically signed by: Gaby Calvillo MD (7/6/2021 2:53 PM) [id number]